# Patient Record
Sex: FEMALE | Race: OTHER | HISPANIC OR LATINO | URBAN - METROPOLITAN AREA
[De-identification: names, ages, dates, MRNs, and addresses within clinical notes are randomized per-mention and may not be internally consistent; named-entity substitution may affect disease eponyms.]

---

## 2023-08-11 ENCOUNTER — EMERGENCY (EMERGENCY)
Facility: HOSPITAL | Age: 28
LOS: 1 days | Discharge: HOME | End: 2023-08-11
Attending: EMERGENCY MEDICINE
Payer: COMMERCIAL

## 2023-08-11 VITALS
HEART RATE: 76 BPM | TEMPERATURE: 99 F | SYSTOLIC BLOOD PRESSURE: 118 MMHG | OXYGEN SATURATION: 97 % | DIASTOLIC BLOOD PRESSURE: 75 MMHG | RESPIRATION RATE: 17 BRPM | HEIGHT: 60 IN | WEIGHT: 179.9 LBS

## 2023-08-11 PROCEDURE — 99284 EMERGENCY DEPT VISIT MOD MDM: CPT

## 2023-08-11 PROCEDURE — 74019 RADEX ABDOMEN 2 VIEWS: CPT | Mod: 26

## 2023-08-11 PROCEDURE — 74019 RADEX ABDOMEN 2 VIEWS: CPT

## 2023-08-11 PROCEDURE — 71046 X-RAY EXAM CHEST 2 VIEWS: CPT | Mod: 26

## 2023-08-11 PROCEDURE — 71046 X-RAY EXAM CHEST 2 VIEWS: CPT

## 2023-08-11 PROCEDURE — 99284 EMERGENCY DEPT VISIT MOD MDM: CPT | Mod: 25

## 2023-08-11 NOTE — ED PROVIDER NOTE - PATIENT PORTAL LINK FT
You can access the FollowMyHealth Patient Portal offered by [facility] by registering at the following website: [email or web address]. By joining [employer]’s FollowMyHealth portal, you will also be able to view your health information using other applications (apps) compatible with our system.

## 2023-08-11 NOTE — ED PROVIDER NOTE - CLINICAL SUMMARY MEDICAL DECISION MAKING FREE TEXT BOX
Possible ingestion of metal wire that was embedded in cupcake today.  No symptoms.  Pictures from phone of similar FB found in food show a narrow metal bristle/wire presumably from cleaning instrument.  VSS, nontoxic, no airway symptoms or concerns, protecting airway, no neck/chest wall/abd tenderness to palpation or crepitation.  XRs to eval for FB.  Probable d/c c close return precautions if XRs WNL.  --BMM

## 2023-08-11 NOTE — ED PROVIDER NOTE - NSFOLLOWUPINSTRUCTIONS_ED_ALL_ED_FT
Follow up with your primary Dr. for reevaluation, call Monday for appointment.    Return for any concerns or new symptoms.

## 2023-08-11 NOTE — ED ADULT NURSE NOTE - CAS EDN DISCHARGE ASSESSMENT
Alert and oriented to person, place and time/Patient baseline mental status/Awake Target Cumulative Dosage (In Mg/Kg): 135

## 2023-08-11 NOTE — ED ADULT NURSE NOTE - NSFALLUNIVINTERV_ED_ALL_ED
Bed/Stretcher in lowest position, wheels locked, appropriate side rails in place/Call bell, personal items and telephone in reach/Instruct patient to call for assistance before getting out of bed/chair/stretcher/Non-slip footwear applied when patient is off stretcher/[place] to call system/Physically safe environment - no spills, clutter or unnecessary equipment/Purposeful proactive rounding/Room/bathroom lighting operational, light cord in reach

## 2023-08-11 NOTE — ED ADULT NURSE NOTE - OBJECTIVE STATEMENT
Pt is a 27y female who presents to [facility] ED c/o of foreign substance ingestion. Pt endorses around [id number] she was at a bakery eating carrot cake when she noticed in the carrot cake metal bristle pieces describing them looking like metal pieces from a scrapping metal brush from a grill, pt unsure if she consumed any before she noticed it in the cake so she came to ED for eval. No significant PMH or PSH. Pt is A&Ox4 currently denying any HA, visual deficits, SOB, cough, CP, palpitations, abd pain, urinary symptoms, n/v/d/c, fever/chills, weakness/fatigue. Pt ambulates independently at baseline.

## 2023-08-11 NOTE — ED PROVIDER NOTE - OBJECTIVE STATEMENT
27 y F no PMH c/o possible FB ingestion today. Reports she was eating carrot and found metal bristles in cake today. She concerns possible FB ingestion. Asymptomatic now.  Denies throat pain, swallowing, or difficult breathing. Denies ABD pain or N/V/D.

## 2024-11-10 ENCOUNTER — EMERGENCY (EMERGENCY)
Facility: HOSPITAL | Age: 29
LOS: 1 days | Discharge: HOME | End: 2024-11-10
Attending: STUDENT IN AN ORGANIZED HEALTH CARE EDUCATION/TRAINING PROGRAM
Payer: COMMERCIAL

## 2024-11-10 VITALS
OXYGEN SATURATION: 97 % | SYSTOLIC BLOOD PRESSURE: 128 MMHG | TEMPERATURE: 98 F | HEART RATE: 86 BPM | WEIGHT: 175.05 LBS | RESPIRATION RATE: 18 BRPM | HEIGHT: 63 IN | DIASTOLIC BLOOD PRESSURE: 66 MMHG

## 2024-11-10 PROCEDURE — 90377 RABIES IG HT&SOL HUMAN IM/SC: CPT

## 2024-11-10 PROCEDURE — 99283 EMERGENCY DEPT VISIT LOW MDM: CPT | Mod: 25

## 2024-11-10 PROCEDURE — 90472 IMMUNIZATION ADMIN EACH ADD: CPT

## 2024-11-10 PROCEDURE — 96372 THER/PROPH/DIAG INJ SC/IM: CPT

## 2024-11-10 PROCEDURE — 90471 IMMUNIZATION ADMIN: CPT

## 2024-11-10 PROCEDURE — 90715 TDAP VACCINE 7 YRS/> IM: CPT

## 2024-11-10 PROCEDURE — 90675 RABIES VACCINE IM: CPT

## 2024-11-10 PROCEDURE — 99284 EMERGENCY DEPT VISIT MOD MDM: CPT

## 2024-11-10 RX ORDER — CLOSTRIDIUM TETANI TOXOID ANTIGEN (FORMALDEHYDE INACTIVATED), CORYNEBACTERIUM DIPHTHERIAE TOXOID ANTIGEN (FORMALDEHYDE INACTIVATED), BORDETELLA PERTUSSIS TOXOID ANTIGEN (GLUTARALDEHYDE INACTIVATED), BORDETELLA PERTUSSIS FILAMENTOUS HEMAGGLUTININ ANTIGEN (FORMALDEHYDE INACTIVATED), BORDETELLA PERTUSSIS PERTACTIN ANTIGEN, AND BORDETELLA PERTUSSIS FIMBRIAE 2/3 ANTIGEN 5; 2; 2.5; 5; 3; 5 [LF]/.5ML; [LF]/.5ML; UG/.5ML; UG/.5ML; UG/.5ML; UG/.5ML
0.5 INJECTION, SUSPENSION INTRAMUSCULAR ONCE
Refills: 0 | Status: COMPLETED | OUTPATIENT
Start: 2024-11-10 | End: 2024-11-10

## 2024-11-10 RX ORDER — RABIES IMMUNE GLOBULIN (HUMAN) 150 [IU]/ML
1600 INJECTION INTRAMUSCULAR ONCE
Refills: 0 | Status: COMPLETED | OUTPATIENT
Start: 2024-11-10 | End: 2024-11-10

## 2024-11-10 RX ADMIN — Medication 1 MILLILITER(S): at 17:31

## 2024-11-10 RX ADMIN — RABIES IMMUNE GLOBULIN (HUMAN) 1600 UNIT(S): 150 INJECTION INTRAMUSCULAR at 18:00

## 2024-11-10 RX ADMIN — CLOSTRIDIUM TETANI TOXOID ANTIGEN (FORMALDEHYDE INACTIVATED), CORYNEBACTERIUM DIPHTHERIAE TOXOID ANTIGEN (FORMALDEHYDE INACTIVATED), BORDETELLA PERTUSSIS TOXOID ANTIGEN (GLUTARALDEHYDE INACTIVATED), BORDETELLA PERTUSSIS FILAMENTOUS HEMAGGLUTININ ANTIGEN (FORMALDEHYDE INACTIVATED), BORDETELLA PERTUSSIS PERTACTIN ANTIGEN, AND BORDETELLA PERTUSSIS FIMBRIAE 2/3 ANTIGEN 0.5 MILLILITER(S): 5; 2; 2.5; 5; 3; 5 INJECTION, SUSPENSION INTRAMUSCULAR at 17:58

## 2024-11-10 NOTE — ED ADULT NURSE NOTE - NSFALLUNIVINTERV_ED_ALL_ED
Bed/Stretcher in lowest position, wheels locked, appropriate side rails in place/Call bell, personal items and telephone in reach/Instruct patient to call for assistance before getting out of bed/chair/stretcher/Non-slip footwear applied when patient is off stretcher/[place] to call system/Physically safe environment - no spills, clutter or unnecessary equipment/Purposeful proactive rounding/Room/bathroom lighting operational, light cord in reach

## 2024-11-10 NOTE — ED PROVIDER NOTE - PROGRESS NOTE DETAILS
Patient is informed of refill    patient is going to adopt the cat and have the vet test it. will still provide her dc intructions to return for series of vaccines

## 2024-11-10 NOTE — ED PROVIDER NOTE - NSFOLLOWUPINSTRUCTIONS_ED_ALL_ED_FT
YOU WERE SEEN IN THE EMERGENCY ROOM BECAUSE YOU WERE BIT BY A CAT. YOU ARE HAVING THE ANIMAL TESTED< BUT PLEASE RETURN IN 3 DAYS IF YOU ARE UNABLE TO.     RETURN TO THE EMERGENCY ROOM IF YOU HAVE ANY FEVERS CHILLS NUASEA VOMITING EXTREME THIRST FEAR OF WATER  ***********************************************************  Here is some more information about Rabies:    WHAT IS RABIES?  Rabies is a rare but serious disease caused by a virus. It affects the nerves and brain.  The virus is usually transmitted by a bite from an infected animal. Rabies can be prevented if the bitten person gets treatment quickly. If a person isn't treated and develops rabies, it is almost always fatal.    WHAT ARE SIGNS OF RABIES?  The first symptoms of rabies can appear from a few days to more than a year after the bite happens.  At first, there's a tingling, prickling, or itching feeling around the bite area. A person also might have flu-like symptoms such as a fever, headache, muscle aches, loss of appetite, nausea, and tiredness.  After a few days, neurologic symptoms develop, including: irritability or aggressiveness; excessive movements or agitation; confusion, bizarre or strange thoughts, or hallucinations; muscle spasms and unusual postures; seizures (convulsions); weakness or paralysis (when a person cannot move some part of the body); extreme sensitivity to bright lights, sounds, or touch.  Someone with rabies can produce a lot of saliva (spit) and muscle spasms in their throat might make it hard to swallow. This causes the "foaming at the mouth" effect that has long been associated with rabies infection. It also leads to a fear of choking or what seems like a "fear of water," another well-known rabies sign.    WHAT CAUSES RABIES?  Rabies is caused by the rabies virus. Infected animals have the virus in their saliva. The virus enters the body through broken skin or the eyes, nose, or mouth, and travels through nerves to the brain. There it multiplies and causes inflammation and damage.  Bites from a wild infected animal cause most [place] rabies cases. Raccoons are the most common carriers, but bats are most likely to infect people. Skunks and foxes also can be infected, and a few cases have been reported in wolves, coyotes, bobcats, and ferrets. Small rodents such as hamsters, squirrels, chipmunks, mice, and rabbits are rarely infected. Widespread animal vaccination has made transmission from dogs to people rare in the [place] In the rest of the world, exposure to rabid dogs is the most common cause of transmission to humans.  Rabies is not contagious from person to person. The virus most often spreads through bites from an infected animal. But it can also spread if the animal's saliva (spit) gets directly into a person's eyes, nose, mouth, or an open wound (such as a scratch or a scrape).    HOW IS RABIES DIAGNOSED?  There's no way to know right away if a wild animal has rabies. When a person is bitten by or exposed to an animal that might be sick, doctors don't wait for a diagnosis — they treat right away. Lab tests can check for infection, but the results come later in the disease, when it would be too late to treat.  A biting animal that's caught can be tested to see the virus is in its brain, but it must be euthanized (put to sleep) first. If it's a healthy pet, such as a dog, cat, or ferret, experts recommend watching the animal for 10 days to see if it gets sick. If it's a rabbit, rodent, or other small animal that doesn't usually spread rabies, a doctor can check with the local health department to decide what to do.    HOW IS RABIES TREATED?  If rabies symptoms start, there is no effective treatment. This is why doctors focus on prevention and try to stop the disease right after a person is exposed.  Anyone who thinks they may have been exposed to the rabies virus must get medical care right away.    HOW IS RABIES PREVENTED?  To reduce the chances of rabies exposure: vaccinate your pets; report stray animals to your local health authorities or animal-; remind kids not to touch or feed stray cats or dogs wandering in the neighborhood or elsewhere; teach kids to stay away from wild animals such as bats, raccoons, skunks, and foxes.    Follow up with your pediatrician in 1-2 days to make sure that you are  doing better.

## 2024-11-10 NOTE — ED ADULT NURSE NOTE - OBJECTIVE STATEMENT
28 y/o F, denies medical hx, presents to the ED for cat bite today. pt states she took in stray cat and it bit her R index finer, 2 puncture sites noted, no s/s of infection. went to  and instructed to the ED for tetanus and rabies vaccine. unknown status of cat vaccination history. pt denies fever, chills, NV, weakness, numbness / tingling. pt able to move RUE without difficulty, normal color for race without inflammation / drainage. A&Ox4, speech is clear, following commands, ambulatory.

## 2024-11-10 NOTE — ED PROVIDER NOTE - ATTENDING CONTRIBUTION TO CARE
Yes... 29-year-old female with no reported past medical history, presents to the emergency department after a cat bite to right index finger today. Patient reports she recently found a stray cat and was bathing and feeding it when it bit her index finger that had food on it.  Unknown vaccination status. Patient was seen at urgent care and given antibiotics and sent here for further evaluation. Denies fever, chills, numbness, weakness, tingling, headache.     Well appearing and in NAD, head normal appearing atraumatic, no respiratory distress, Alert and oriented, non focal neuro exam, skin warm and dry, normal affect and mood  LUE: Distal right index finger on palmar aspect has 4 bite marks consistent with cat bite.  Not actively bleeding.    Patient presents after cat bite to right index finger today.  Unknown vaccination of cat.  Given this information, concern for rabies exposure and therefore will initiate rabies vaccination and immunoglobulin.

## 2024-11-10 NOTE — ED PROVIDER NOTE - CHILD ABUSE FACILITY
Pt not yet seen in our facility by Dr Sameera Gaffney. States former pt. Message to Dr Sameera Gaffney to decide correct care plan for pt and ref if needed. [facility]

## 2024-11-10 NOTE — ED PROVIDER NOTE - PATIENT PORTAL LINK FT
You can access the FollowMyHealth Patient Portal offered by [facility] by registering at the following website: [email or web address]. By joining [employer]’s FollowMyHealth portal, you will also be able to view your health information using other applications (apps) compatible with our system.

## 2025-02-19 NOTE — ED PROVIDER NOTE - WR ORDER DATE AND TIME 2
11-Aug-2023 19:46
Our Emergency Department Referral Coordinators will be reaching out to you in the next 24-48 hours from 9:00am to 5:00pm to schedule a follow up appointment with PEDIATRIC NEUROLOGY. Please expect a phone call from the hospital in that time frame. If you do not receive a call or if you have any questions or concerns, you can reach them at   [phone]    Your child can receive 10mL of ibuprofen (motrin, advil) every 6 hours AND 10mL of acetaminophen (Tylenol) every 6 hours. You can alternate these every 3 hours for fever of 100.4 or higher.       Febrile Seizure in Children    WHAT YOU NEED TO KNOW:    A febrile seizure is a convulsion (uncontrolled shaking) caused by a fever of 100.4°F (38°C) or higher. A fever caused by any reason can bring on a febrile seizure in children. Febrile seizures can be simple or complex. A simple febrile seizure lasts less than 15 minutes and does not happen again within 24 hours. A complex febrile seizure lasts longer than 15 minutes or may happen again within 24 hours. Febrile seizures do not cause brain damage or other long-term health problems.    DISCHARGE INSTRUCTIONS:    Call 911 for any of the following:    Your child stops breathing, turns blue, or you cannot feel his or her pulse.    Your child cannot be woken after his or her seizure.    Your child’s seizure lasts more than 5 minutes.    Your child has more than 1 seizure before he or she is fully awake or aware.  Return to the emergency department if:    Your child's fever does not improve after you give him or her medicine.    You have questions or concerns about your child's condition or care.  Contact your child's healthcare provider if:    Your child's fever does not improve after you give him or her medicine.    You have questions or concerns about your child's condition or care.  Medicines:    NSAIDs, such as ibuprofen, help decrease swelling, pain, and fever. This medicine is available with or without a doctor's order. NSAIDs can cause stomach bleeding or kidney problems in certain people. If your child takes blood thinner medicine, always ask if NSAIDs are safe for him or her. Always read the medicine label and follow directions. Do not give these medicines to children younger than 6 months without direction from a healthcare provider.    Acetaminophen decreases pain and fever. It is available without a doctor's order. Ask how much to give your child and how often to give it. Follow directions. Read the labels of all other medicines your child uses to see if they also contain acetaminophen, or ask your child's doctor or pharmacist. Acetaminophen can cause liver damage if not taken correctly.    Do not give aspirin to children younger than 18 years. Your child could develop Reye syndrome if he or she has the flu or a fever and takes aspirin. Reye syndrome can cause life-threatening brain and liver damage. Check your child's medicine labels for aspirin or salicylates.    Give your child's medicine as directed. Contact your child's healthcare provider if you think the medicine is not working as expected. Tell the provider if your child is allergic to any medicine. Keep a current list of the medicines, vitamins, and herbs your child takes. Include the amounts, and when, how, and why they are taken. Bring the list or the medicines in their containers to follow-up visits. Carry your child's medicine list with you in case of an emergency.  If your child has another seizure:    Do not panic.    Note the start time of the seizure. Record how long it lasts.    Gently guide your child to the floor or a soft surface. Remove sharp or hard objects from the area surrounding your child, or cushion his or her head.    Place your child on his or her side to help prevent him or her from swallowing saliva or vomit.    Remove any objects from your child's mouth. Do not put anything in your child's mouth. This may prevent him or her from breathing.    Perform CPR if your child stops breathing or you cannot feel his or her pulse.  Follow up with your child's healthcare provider as directed: Write down your questions so you remember to ask them during your visits.